# Patient Record
Sex: FEMALE | Race: WHITE | URBAN - METROPOLITAN AREA
[De-identification: names, ages, dates, MRNs, and addresses within clinical notes are randomized per-mention and may not be internally consistent; named-entity substitution may affect disease eponyms.]

---

## 2020-02-27 ENCOUNTER — OFFICE VISIT (OUTPATIENT)
Dept: URBAN - METROPOLITAN AREA CLINIC 25 | Facility: CLINIC | Age: 60
End: 2020-02-27
Payer: COMMERCIAL

## 2020-02-27 DIAGNOSIS — H16.223 KERATOCONJUNCTIVITIS SICCA, NOT SPECIFIED AS SJÖGREN'S, BILATERAL: ICD-10-CM

## 2020-02-27 PROCEDURE — 92014 COMPRE OPH EXAM EST PT 1/>: CPT | Performed by: OPTOMETRIST

## 2020-02-27 ASSESSMENT — INTRAOCULAR PRESSURE
OD: 15
OS: 19

## 2020-02-27 ASSESSMENT — VISUAL ACUITY
OD: 20/20
OS: 20/20

## 2020-02-27 NOTE — IMPRESSION/PLAN
Impression: Open angle with borderline findings, high risk, bilateral: H40.023. Plan: pigment dispersion.  monitor yearly

## 2021-05-18 ENCOUNTER — OFFICE VISIT (OUTPATIENT)
Dept: URBAN - METROPOLITAN AREA CLINIC 22 | Facility: CLINIC | Age: 61
End: 2021-05-18
Payer: COMMERCIAL

## 2021-05-18 DIAGNOSIS — H40.023 OPEN ANGLE WITH BORDERLINE FINDINGS, HIGH RISK, BILATERAL: Primary | ICD-10-CM

## 2021-05-18 DIAGNOSIS — H04.123 DRY EYE SYNDROME OF BILATERAL LACRIMAL GLANDS: ICD-10-CM

## 2021-05-18 DIAGNOSIS — H52.4 PRESBYOPIA: ICD-10-CM

## 2021-05-18 PROCEDURE — 92014 COMPRE OPH EXAM EST PT 1/>: CPT | Performed by: STUDENT IN AN ORGANIZED HEALTH CARE EDUCATION/TRAINING PROGRAM

## 2021-05-18 PROCEDURE — 92133 CPTRZD OPH DX IMG PST SGM ON: CPT | Performed by: STUDENT IN AN ORGANIZED HEALTH CARE EDUCATION/TRAINING PROGRAM

## 2021-05-18 ASSESSMENT — INTRAOCULAR PRESSURE
OD: 16
OS: 20

## 2021-05-18 NOTE — IMPRESSION/PLAN
Impression: Dry eye syndrome of bilateral lacrimal glands: H04.123. Plan: Continue lid scrubs. Rx artificial tears QID and warm compresses BID x 10 min.

## 2021-05-18 NOTE — IMPRESSION/PLAN
Impression: Open angle with borderline findings, high risk, bilateral: H40.023.
-- due to TIDs OU
-- OCT 05/18/21: OD tmm=719; borderline sup OS avg =104; all sectors WNL 
-- IOP WNL but mild asymmetric OS>OD Plan: Discussed findings. OCT RNFL shows borderline superior thinning OD, WNL OS. Recommend returning for baseline HVF 24-2, gonio, and IOP check.

## 2021-05-18 NOTE — IMPRESSION/PLAN
Impression: Presbyopia: H52.4. Plan: Pt to return for refraction with vision plan. New glasses Rx can help improve vision.

## 2021-05-19 ENCOUNTER — OFFICE VISIT (OUTPATIENT)
Dept: URBAN - METROPOLITAN AREA CLINIC 22 | Facility: CLINIC | Age: 61
End: 2021-05-19
Payer: COMMERCIAL

## 2021-05-19 PROCEDURE — 92014 COMPRE OPH EXAM EST PT 1/>: CPT | Performed by: STUDENT IN AN ORGANIZED HEALTH CARE EDUCATION/TRAINING PROGRAM

## 2021-05-19 ASSESSMENT — INTRAOCULAR PRESSURE
OD: 14
OS: 16

## 2021-05-19 ASSESSMENT — VISUAL ACUITY
OS: 20/20
OD: 20/30

## 2021-10-05 ENCOUNTER — OFFICE VISIT (OUTPATIENT)
Dept: URBAN - METROPOLITAN AREA CLINIC 22 | Facility: CLINIC | Age: 61
End: 2021-10-05
Payer: COMMERCIAL

## 2021-10-05 PROCEDURE — 99213 OFFICE O/P EST LOW 20 MIN: CPT | Performed by: STUDENT IN AN ORGANIZED HEALTH CARE EDUCATION/TRAINING PROGRAM

## 2021-10-05 PROCEDURE — 92020 GONIOSCOPY: CPT | Performed by: STUDENT IN AN ORGANIZED HEALTH CARE EDUCATION/TRAINING PROGRAM

## 2021-10-05 PROCEDURE — 92083 EXTENDED VISUAL FIELD XM: CPT | Performed by: STUDENT IN AN ORGANIZED HEALTH CARE EDUCATION/TRAINING PROGRAM

## 2021-10-05 ASSESSMENT — INTRAOCULAR PRESSURE
OS: 18
OD: 18

## 2021-10-05 NOTE — IMPRESSION/PLAN
Impression: Dry eye syndrome of bilateral lacrimal glands: H04.123. Plan: Discussed findings and patient educated on condition. Rx artificial tears QID OU.

## 2021-10-05 NOTE — IMPRESSION/PLAN
Impression: Open angle with borderline findings, high risk, bilateral: H40.023.
-- due to h/o TIDs, pigment dispersion OU
-- OCT 05/18/21: OD zms=181; borderline sup OS avg =104; all sectors WNL 
-- IOP WNL OU
-- HVF 10/05/21: OD few paracentral
            OS high FP, couple inf
-- gonio 10/05/21:  w/ 2+TM pig OU Plan: Discussed findings. HVF OD few paracentral misses; OS unreliable due to high FP, showed couple inf misses. IOP WNL OU. Gonio open OU. Monitor, treatment not indicated at this time. RTC in 6 months for DFE and OCT RNFL.

## 2022-04-11 ENCOUNTER — OFFICE VISIT (OUTPATIENT)
Dept: URBAN - METROPOLITAN AREA CLINIC 22 | Facility: CLINIC | Age: 62
End: 2022-04-11
Payer: COMMERCIAL

## 2022-04-11 DIAGNOSIS — H43.811 VITREOUS DEGENERATION, RIGHT EYE: ICD-10-CM

## 2022-04-11 DIAGNOSIS — H40.023 OPEN ANGLE WITH BORDERLINE FINDINGS, HIGH RISK, BILATERAL: Primary | ICD-10-CM

## 2022-04-11 DIAGNOSIS — H16.223 KERATOCONJUNCTIVITIS SICCA, NOT SPECIFIED AS SJÖGREN'S, BILATERAL: ICD-10-CM

## 2022-04-11 DIAGNOSIS — H25.13 AGE-RELATED NUCLEAR CATARACT, BILATERAL: ICD-10-CM

## 2022-04-11 PROCEDURE — 92133 CPTRZD OPH DX IMG PST SGM ON: CPT | Performed by: STUDENT IN AN ORGANIZED HEALTH CARE EDUCATION/TRAINING PROGRAM

## 2022-04-11 PROCEDURE — 92014 COMPRE OPH EXAM EST PT 1/>: CPT | Performed by: STUDENT IN AN ORGANIZED HEALTH CARE EDUCATION/TRAINING PROGRAM

## 2022-04-11 ASSESSMENT — INTRAOCULAR PRESSURE
OD: 14
OS: 15

## 2022-04-11 NOTE — IMPRESSION/PLAN
Impression: Open angle with borderline findings, high risk, bilateral: H40.023.
-- due to h/o TIDs, pigment dispersion OU
-- OCT 04/11/22: ; WNL
                            ; WNL 
-- IOP WNL OU
-- HVF 10/05/21: OD few paracentral
            OS high FP, couple inf
-- gonio 10/05/21:  w/ 2+TM pig OU Plan: Discussed findings and educated patient on condition/risk factors/management. IOP WNL OU, OCT RNFL WNL OU and stable. No K spindles observed today OU. Monitor, treatment not indicated at this time. RTC in 6 months for HVF 24-2 and IOP check.

## 2022-05-24 ENCOUNTER — OFFICE VISIT (OUTPATIENT)
Dept: URBAN - METROPOLITAN AREA CLINIC 22 | Facility: CLINIC | Age: 62
End: 2022-05-24
Payer: COMMERCIAL

## 2022-05-24 DIAGNOSIS — H52.4 PRESBYOPIA: Primary | ICD-10-CM

## 2022-05-24 PROCEDURE — 92014 COMPRE OPH EXAM EST PT 1/>: CPT | Performed by: STUDENT IN AN ORGANIZED HEALTH CARE EDUCATION/TRAINING PROGRAM

## 2022-05-24 ASSESSMENT — VISUAL ACUITY
OS: 20/20
OD: 20/20

## 2022-05-24 ASSESSMENT — INTRAOCULAR PRESSURE
OD: 16
OS: 17

## 2023-03-10 ENCOUNTER — OFFICE VISIT (OUTPATIENT)
Dept: URBAN - METROPOLITAN AREA CLINIC 22 | Facility: CLINIC | Age: 63
End: 2023-03-10
Payer: COMMERCIAL

## 2023-03-10 DIAGNOSIS — H00.024 HORDEOLUM INTERNUM LEFT UPPER EYELID: Primary | ICD-10-CM

## 2023-03-10 PROCEDURE — 99213 OFFICE O/P EST LOW 20 MIN: CPT | Performed by: STUDENT IN AN ORGANIZED HEALTH CARE EDUCATION/TRAINING PROGRAM

## 2023-03-10 RX ORDER — NEOMYCIN SULFATE, POLYMYXIN B SULFATE AND DEXAMETHASONE 3.5; 10000; 1 MG/G; [USP'U]/G; MG/G
OINTMENT OPHTHALMIC
Qty: 3.5 | Refills: 1 | Status: ACTIVE
Start: 2023-03-10

## 2023-03-10 ASSESSMENT — INTRAOCULAR PRESSURE
OD: 16
OS: 16

## 2023-03-10 NOTE — IMPRESSION/PLAN
Impression: Hordeolum internum left upper eyelid: H00.024. Plan: Discussed findings. Rx warm compresses x 10 min QID, Maxitrol sandra TID x 14 days. Contact clinic for follow-up if symptoms persist/worsen.